# Patient Record
Sex: FEMALE | NOT HISPANIC OR LATINO | Employment: FULL TIME | URBAN - METROPOLITAN AREA
[De-identification: names, ages, dates, MRNs, and addresses within clinical notes are randomized per-mention and may not be internally consistent; named-entity substitution may affect disease eponyms.]

---

## 2023-03-20 ENCOUNTER — TELEPHONE (OUTPATIENT)
Dept: PRIMARY CARE | Facility: CLINIC | Age: 63
End: 2023-03-20
Payer: COMMERCIAL

## 2023-03-20 DIAGNOSIS — E11.9 DIABETES MELLITUS (MULTI): Primary | ICD-10-CM

## 2023-03-20 NOTE — TELEPHONE ENCOUNTER
Patient requesting a refill on her Ozempic to [facility]  Patient stated she has gone 2 weeks without this medication and would like it filled ASAP

## 2023-03-23 PROBLEM — E66.9 CLASS 1 OBESITY WITH BODY MASS INDEX (BMI) OF 32.0 TO 32.9 IN ADULT: Status: ACTIVE | Noted: 2023-03-23

## 2023-03-23 PROBLEM — E78.5 HYPERLIPIDEMIA: Status: ACTIVE | Noted: 2023-03-23

## 2023-03-23 PROBLEM — I10 HIGH BLOOD PRESSURE: Status: ACTIVE | Noted: 2023-03-23

## 2023-03-23 PROBLEM — R39.9 UTI SYMPTOMS: Status: ACTIVE | Noted: 2023-03-23

## 2023-03-23 PROBLEM — R74.8 ELEVATED LIVER ENZYMES: Status: ACTIVE | Noted: 2023-03-23

## 2023-03-23 PROBLEM — M54.30 SCIATIC PAIN: Status: ACTIVE | Noted: 2023-03-23

## 2023-03-23 PROBLEM — E11.9 TYPE 2 DIABETES MELLITUS (MULTI): Status: ACTIVE | Noted: 2023-03-23

## 2023-03-23 PROBLEM — R11.2 NAUSEA & VOMITING: Status: ACTIVE | Noted: 2023-03-23

## 2023-03-23 PROBLEM — E11.9 DIABETES MELLITUS (MULTI): Status: ACTIVE | Noted: 2023-03-23

## 2023-03-23 PROBLEM — K57.90 DIVERTICULOSIS: Status: ACTIVE | Noted: 2023-03-23

## 2023-03-23 RX ORDER — SEMAGLUTIDE 1.34 MG/ML
0.5 INJECTION, SOLUTION SUBCUTANEOUS
COMMUNITY
Start: 2023-01-26 | End: 2023-03-23 | Stop reason: SDUPTHER

## 2023-03-23 RX ORDER — TIZANIDINE 4 MG/1
4 TABLET ORAL 3 TIMES DAILY
COMMUNITY

## 2023-03-23 RX ORDER — SEMAGLUTIDE 1.34 MG/ML
0.5 INJECTION, SOLUTION SUBCUTANEOUS
Qty: 1 EACH | Refills: 3 | Status: SHIPPED | OUTPATIENT
Start: 2023-03-23

## 2023-03-23 RX ORDER — HYDROCHLOROTHIAZIDE 25 MG/1
25 TABLET ORAL DAILY
COMMUNITY
Start: 2023-02-25

## 2023-03-23 RX ORDER — IBUPROFEN 600 MG/1
1 TABLET ORAL EVERY 8 HOURS PRN
COMMUNITY
Start: 2022-12-20

## 2023-03-23 RX ORDER — GLIPIZIDE 10 MG/1
10 TABLET ORAL 2 TIMES DAILY
COMMUNITY
Start: 2022-09-05

## 2023-03-23 RX ORDER — ATORVASTATIN CALCIUM 10 MG/1
10 TABLET, FILM COATED ORAL DAILY
COMMUNITY
Start: 2023-01-25

## 2023-03-23 RX ORDER — LISINOPRIL 40 MG/1
1 TABLET ORAL DAILY
COMMUNITY
Start: 2023-02-25

## 2023-03-23 RX ORDER — METOCLOPRAMIDE 5 MG/1
5 TABLET ORAL 3 TIMES DAILY
COMMUNITY
Start: 2022-06-29

## 2023-03-23 RX ORDER — GABAPENTIN 300 MG/1
300 CAPSULE ORAL 3 TIMES DAILY
COMMUNITY
Start: 2023-02-25

## 2023-03-23 NOTE — TELEPHONE ENCOUNTER
PATIENT IS EXTREMELY ANGRY REGARDING THE ISSUES WITH GETTING HER MEDICATION. SHE HAS BEEN WITHOUT DIABETIC MEDS FOR 3 WEEKS.  I AM GOING TO SET HER AN APPOINTMENT FOR Tuesday IN THE [facility] BUT SHE NEEDS MEDS ASAP.    PLEASE ADVISE [phone]

## 2023-03-23 NOTE — TELEPHONE ENCOUNTER
[facility]

## 2023-03-27 ENCOUNTER — TELEPHONE (OUTPATIENT)
Dept: PRIMARY CARE | Facility: CLINIC | Age: 63
End: 2023-03-27
Payer: COMMERCIAL

## 2023-03-27 NOTE — TELEPHONE ENCOUNTER
PATIENT CALLED BACK STATING THE DOSAGE IN INCORRECT PER THE PHARMACY ([facility] CLARICE BOYLE) FOR THE CURRENT PA.    PLEASE ADVISE

## 2023-05-31 ENCOUNTER — OFFICE VISIT (OUTPATIENT)
Dept: PRIMARY CARE | Facility: CLINIC | Age: 63
End: 2023-05-31
Payer: COMMERCIAL

## 2023-05-31 VITALS
HEART RATE: 73 BPM | TEMPERATURE: 97.4 F | WEIGHT: 213 LBS | DIASTOLIC BLOOD PRESSURE: 76 MMHG | BODY MASS INDEX: 35.45 KG/M2 | OXYGEN SATURATION: 99 % | SYSTOLIC BLOOD PRESSURE: 129 MMHG | RESPIRATION RATE: 16 BRPM

## 2023-05-31 DIAGNOSIS — E11.69 TYPE 2 DIABETES MELLITUS WITH OTHER SPECIFIED COMPLICATION, WITHOUT LONG-TERM CURRENT USE OF INSULIN (MULTI): Primary | ICD-10-CM

## 2023-05-31 DIAGNOSIS — I10 PRIMARY HYPERTENSION: ICD-10-CM

## 2023-05-31 DIAGNOSIS — E78.5 HYPERLIPIDEMIA, UNSPECIFIED HYPERLIPIDEMIA TYPE: ICD-10-CM

## 2023-05-31 DIAGNOSIS — M79.672 PAIN OF LEFT HEEL: ICD-10-CM

## 2023-05-31 PROCEDURE — 99213 OFFICE O/P EST LOW 20 MIN: CPT | Performed by: NURSE PRACTITIONER

## 2023-05-31 PROCEDURE — 1036F TOBACCO NON-USER: CPT | Performed by: NURSE PRACTITIONER

## 2023-05-31 PROCEDURE — 3074F SYST BP LT 130 MM HG: CPT | Performed by: NURSE PRACTITIONER

## 2023-05-31 PROCEDURE — 3078F DIAST BP <80 MM HG: CPT | Performed by: NURSE PRACTITIONER

## 2023-05-31 PROCEDURE — 4010F ACE/ARB THERAPY RXD/TAKEN: CPT | Performed by: NURSE PRACTITIONER

## 2023-05-31 RX ORDER — POTASSIUM CHLORIDE 750 MG/1
CAPSULE, EXTENDED RELEASE ORAL
COMMUNITY
Start: 2023-04-24

## 2023-05-31 ASSESSMENT — ENCOUNTER SYMPTOMS
PSYCHIATRIC NEGATIVE: 1
RESPIRATORY NEGATIVE: 1
GASTROINTESTINAL NEGATIVE: 1
NEUROLOGICAL NEGATIVE: 1
HEMATOLOGIC/LYMPHATIC NEGATIVE: 1
JOINT SWELLING: 1
ARTHRALGIAS: 1
ENDOCRINE NEGATIVE: 1
ALLERGIC/IMMUNOLOGIC NEGATIVE: 1
CARDIOVASCULAR NEGATIVE: 1
CONSTITUTIONAL NEGATIVE: 1
EYES NEGATIVE: 1

## 2023-05-31 NOTE — PROGRESS NOTES
Patient's PCP is Kelly J Slavik-Bosworth, APRN-CNP      Patient to discuss LEFT  HEEL PAIN  Pain Scale:  7 to a 9 out of 10   Length of Symptoms: Couple weeks   Denies:  Factors that effect symptoms: Ice, changing shoes throughout the day, ibuprofen,     Patient to discuss MEDICATIONS   -Glipizide ---discuss changing   -sugar stats have increased  -bloated,     --Related Information--

## 2023-05-31 NOTE — PROGRESS NOTES
Subjective   Patient ID: Virgen Murillo is a 63 y.o. female who presents for No chief complaint on file..  Patient presents to convenient care appointment with complaint of left heel pain x 2 weeks.  Patient reports increased pain with standing and walking.  Patient has been icing and elevating with minimal relief.  Patient reports no ozempic due to insurance, and has gained 15 lbs in two months.  Glucose at home has been 110  increased to 170s.  Diet and exercise has been same.  Patient was previously on ozempic x 2 years with great success.  Not taking last two months.            Review of Systems   Constitutional: Negative.    HENT: Negative.     Eyes: Negative.    Respiratory: Negative.     Cardiovascular: Negative.    Gastrointestinal: Negative.    Endocrine: Negative.    Genitourinary: Negative.    Musculoskeletal:  Positive for arthralgias and joint swelling.   Skin: Negative.    Allergic/Immunologic: Negative.    Neurological: Negative.    Hematological: Negative.    Psychiatric/Behavioral: Negative.     All other systems reviewed and are negative.      /76   Pulse 73   Temp 36.3 °C (97.4 °F)   Resp 16   Wt 96.6 kg (213 lb)   SpO2 99%   BMI 35.45 kg/m²     Objective   Physical Exam  Vitals and nursing note reviewed.   Constitutional:       Appearance: Normal appearance.   HENT:      Head: Normocephalic and atraumatic.      Right Ear: Tympanic membrane, ear canal and external ear normal.      Left Ear: Tympanic membrane, ear canal and external ear normal.      Nose: Nose normal.      Mouth/Throat:      Mouth: Mucous membranes are moist.      Pharynx: Oropharynx is clear.   Eyes:      Extraocular Movements: Extraocular movements intact.      Conjunctiva/sclera: Conjunctivae normal.      Pupils: Pupils are equal, round, and reactive to light.   Cardiovascular:      Rate and Rhythm: Normal rate and regular rhythm.      Pulses: Normal pulses.      Heart sounds: Normal heart sounds.   Pulmonary:       Effort: Pulmonary effort is normal.      Breath sounds: No wheezing, rhonchi or rales.   Abdominal:      General: Bowel sounds are normal.      Palpations: Abdomen is soft.   Musculoskeletal:         General: Swelling and tenderness present. No deformity or signs of injury. Normal range of motion.      Cervical back: Normal range of motion and neck supple.      Right lower leg: No edema.      Left lower leg: Edema present.      Comments: Patient has increased tenderness with palpation of left heel at achilles tendon insertion on calcaneous.  Mild non-pitting edema present posterior heel. No ecchymosis, erythema, nor wound present.    Skin:     General: Skin is warm and dry.      Capillary Refill: Capillary refill takes less than 2 seconds.   Neurological:      General: No focal deficit present.      Mental Status: She is alert and oriented to person, place, and time.   Psychiatric:         Mood and Affect: Mood normal.         Behavior: Behavior normal.         Thought Content: Thought content normal.         Judgment: Judgment normal.         Assessment/Plan   Problem List Items Addressed This Visit          Circulatory    High blood pressure     Continue to monitor BP and have labs completed.  Patient will continue to follow low sodium diet as discussed.          Relevant Orders    Comprehensive Metabolic Panel       Musculoskeletal    Pain of left heel     Patient will continue to ice, elevate and follow up with orthopedics.           Relevant Orders    Referral to Orthopaedic Surgery       Endocrine/Metabolic    Type 2 diabetes mellitus (CMS/HCC) - Primary     Patient will continue to check glucose at home as directed.  Patient will be contacted for referral to endocrine scheduling.          Relevant Orders    Hemoglobin A1C    Referral to Endocrinology       Other    Hyperlipidemia     Lab to be completed and patient will be notified of result when complete. Continue healthy diet, low in saturated fats.            Relevant Orders    Lipid Panel

## 2023-06-01 ENCOUNTER — LAB (OUTPATIENT)
Dept: LAB | Facility: LAB | Age: 63
End: 2023-06-01
Payer: COMMERCIAL

## 2023-06-01 DIAGNOSIS — E11.69 TYPE 2 DIABETES MELLITUS WITH OTHER SPECIFIED COMPLICATION, WITHOUT LONG-TERM CURRENT USE OF INSULIN (MULTI): ICD-10-CM

## 2023-06-01 DIAGNOSIS — I10 PRIMARY HYPERTENSION: ICD-10-CM

## 2023-06-01 DIAGNOSIS — E78.5 HYPERLIPIDEMIA, UNSPECIFIED HYPERLIPIDEMIA TYPE: ICD-10-CM

## 2023-06-01 PROBLEM — M79.672 PAIN OF LEFT HEEL: Status: ACTIVE | Noted: 2023-06-01

## 2023-06-01 LAB
ALANINE AMINOTRANSFERASE (SGPT) (U/L) IN SER/PLAS: 31 U/L (ref 7–45)
ALBUMIN (G/DL) IN SER/PLAS: 4.2 G/DL (ref 3.4–5)
ALKALINE PHOSPHATASE (U/L) IN SER/PLAS: 52 U/L (ref 33–136)
ANION GAP IN SER/PLAS: 11 MMOL/L (ref 10–20)
ASPARTATE AMINOTRANSFERASE (SGOT) (U/L) IN SER/PLAS: 32 U/L (ref 9–39)
BILIRUBIN TOTAL (MG/DL) IN SER/PLAS: 0.9 MG/DL (ref 0–1.2)
CALCIUM (MG/DL) IN SER/PLAS: 9.7 MG/DL (ref 8.6–10.3)
CARBON DIOXIDE, TOTAL (MMOL/L) IN SER/PLAS: 30 MMOL/L (ref 21–32)
CHLORIDE (MMOL/L) IN SER/PLAS: 102 MMOL/L (ref 98–107)
CHOLESTEROL (MG/DL) IN SER/PLAS: 150 MG/DL (ref 0–199)
CHOLESTEROL IN HDL (MG/DL) IN SER/PLAS: 36 MG/DL
CHOLESTEROL/HDL RATIO: 4.2
CREATININE (MG/DL) IN SER/PLAS: 1.17 MG/DL (ref 0.5–1.05)
ESTIMATED AVERAGE GLUCOSE FOR HBA1C: 223 MG/DL
GFR FEMALE: 52 ML/MIN/1.73M2
GLUCOSE (MG/DL) IN SER/PLAS: 180 MG/DL (ref 74–99)
HEMOGLOBIN A1C/HEMOGLOBIN TOTAL IN BLOOD: 9.4 %
LDL: 87 MG/DL (ref 0–99)
POTASSIUM (MMOL/L) IN SER/PLAS: 4.4 MMOL/L (ref 3.5–5.3)
PROTEIN TOTAL: 7.3 G/DL (ref 6.4–8.2)
SODIUM (MMOL/L) IN SER/PLAS: 139 MMOL/L (ref 136–145)
TRIGLYCERIDE (MG/DL) IN SER/PLAS: 134 MG/DL (ref 0–149)
UREA NITROGEN (MG/DL) IN SER/PLAS: 21 MG/DL (ref 6–23)
VLDL: 27 MG/DL (ref 0–40)

## 2023-06-01 PROCEDURE — 83036 HEMOGLOBIN GLYCOSYLATED A1C: CPT

## 2023-06-01 PROCEDURE — 36415 COLL VENOUS BLD VENIPUNCTURE: CPT

## 2023-06-01 PROCEDURE — 80061 LIPID PANEL: CPT

## 2023-06-01 PROCEDURE — 80053 COMPREHEN METABOLIC PANEL: CPT

## 2023-06-01 RX ORDER — LIDOCAINE 50 MG/G
1 PATCH TOPICAL DAILY
Qty: 7 PATCH | Refills: 0 | Status: SHIPPED | OUTPATIENT
Start: 2023-06-01 | End: 2023-06-08

## 2023-06-01 NOTE — ASSESSMENT & PLAN NOTE
Patient will continue to check glucose at home as directed.  Patient will be contacted for referral to endocrine scheduling.

## 2023-06-01 NOTE — PATIENT INSTRUCTIONS
Patient was seen and examined.  Diagnosis, treatment, and possible complications of today's illness discussed and explained to patient.  Patient to take medications associated with this visit.  Patient may also take OTC analgesic/antipyretic if needed for pain/fever.  Advised to increase oral fluid intake as tolerated if no nausea or vomiting.  Patient educated and advised to come back if worsening or persistent symptoms. Patient educated on when to seek urgent/emergent care. Patient was given opportunity to ask questions and denied any at this time.  Patient verbalized understanding and agrees with plan of care.

## 2023-06-01 NOTE — ASSESSMENT & PLAN NOTE
Patient will continue to ice, elevate and follow up with orthopedics.   Patient can use lidocaine patch as directed for pain.

## 2023-06-01 NOTE — ASSESSMENT & PLAN NOTE
Continue to monitor BP and have labs completed.  Patient will continue to follow low sodium diet as discussed.

## 2023-06-01 NOTE — ASSESSMENT & PLAN NOTE
Lab to be completed and patient will be notified of result when complete. Continue healthy diet, low in saturated fats.

## 2023-06-02 NOTE — RESULT ENCOUNTER NOTE
Discussed lab results with patient. Hdl lower 36, increase exercise.  A1C elevated to 9/4 an glucose 180.  Patient has referral to endocrine and is scheduling at this time.  Patient will continue to work on diet and exercise to maintain lower glucose readings.

## 2023-06-09 ENCOUNTER — TELEPHONE (OUTPATIENT)
Dept: PRIMARY CARE | Facility: CLINIC | Age: 63
End: 2023-06-09
Payer: COMMERCIAL

## 2023-06-09 DIAGNOSIS — M79.672 PAIN OF LEFT HEEL: Primary | ICD-10-CM

## 2023-06-09 RX ORDER — LIDOCAINE 50 MG/G
1 PATCH TOPICAL DAILY
Qty: 7 PATCH | Refills: 0 | Status: SHIPPED | OUTPATIENT
Start: 2023-06-09 | End: 2023-06-16

## 2023-06-09 NOTE — TELEPHONE ENCOUNTER
Spoke with patient regarding appointments and medication.  Patient will reschedule orthopedic appointment and follow up.

## 2023-06-09 NOTE — TELEPHONE ENCOUNTER
Rx Refill Request Telephone Encounter    Name:  Virgen Murillo  :  [id number]  Medication Name:  lidocaine (Lidoderm) 5 % patch   Specific Pharmacy location:  [facility]  Date of last appointment:    Date of next appointment:  NA  Best number to reach patient:  [phone]

## 2023-06-26 ENCOUNTER — TELEPHONE (OUTPATIENT)
Dept: PRIMARY CARE | Facility: CLINIC | Age: 63
End: 2023-06-26
Payer: COMMERCIAL

## 2023-06-26 DIAGNOSIS — E11.69 TYPE 2 DIABETES MELLITUS WITH OTHER SPECIFIED COMPLICATION, WITHOUT LONG-TERM CURRENT USE OF INSULIN (MULTI): Primary | ICD-10-CM

## 2023-06-26 DIAGNOSIS — R11.0 NAUSEA: ICD-10-CM

## 2023-06-26 RX ORDER — ONDANSETRON 4 MG/1
4 TABLET, ORALLY DISINTEGRATING ORAL EVERY 8 HOURS PRN
Qty: 20 TABLET | Refills: 0 | Status: SHIPPED | OUTPATIENT
Start: 2023-06-26 | End: 2023-07-11

## 2023-06-26 RX ORDER — METFORMIN HYDROCHLORIDE 500 MG/1
500 TABLET, EXTENDED RELEASE ORAL
Qty: 30 TABLET | Refills: 11 | Status: SHIPPED | OUTPATIENT
Start: 2023-06-26 | End: 2024-06-25

## 2023-06-26 NOTE — TELEPHONE ENCOUNTER
Patient called in wondering if Nurse Salome has received the message yet and if she has had a chance to respond. Patient was informed that Salome got in the office today at 10 am and has been slammed with patient's ever since.

## 2023-06-26 NOTE — TELEPHONE ENCOUNTER
Patient called stating a referral to endocrinology was put in the system and patient cannot get in for an apt until August. Patient stated the diabetes medication that was prescribed to her is not working, she stated her blood sugar was in the 230's last week.   Patient would like a call back from Salome regarding this.   Please advise

## 2023-06-26 NOTE — TELEPHONE ENCOUNTER
Contacted patient .  Discussed previous medication Metformin.  Patient reported vomiting and was taken off many years ago.  Patient has appointment scheduled for August with endocrinologist.  Patient will take Metformin  mg daily and contact office with any GI concerns. Patient reports daily glucose 200s and will continue to monitor.

## 2023-07-19 DIAGNOSIS — E11.69 TYPE 2 DIABETES MELLITUS WITH OTHER SPECIFIED COMPLICATION, WITHOUT LONG-TERM CURRENT USE OF INSULIN (MULTI): Primary | ICD-10-CM

## 2023-07-20 RX ORDER — METFORMIN HYDROCHLORIDE 500 MG/1
500 TABLET, EXTENDED RELEASE ORAL
Qty: 30 TABLET | Refills: 11 | OUTPATIENT
Start: 2023-07-20 | End: 2024-07-19

## 2023-07-20 NOTE — TELEPHONE ENCOUNTER
Medication refused due to failing protocol.    Requested Prescriptions   Pending Prescriptions Disp Refills    metFORMIN XR (Glucophage-XR) 500 mg 24 hr tablet [Pharmacy Med Name: METFORMIN HCL  MG TABLET] 30 tablet 11     Sig: TAKE 1 TABLET (500 MG) BY MOUTH ONCE DAILY WITH A MEAL. DO NOT CRUSH, CHEW, OR SPLIT.       There is no refill protocol information for this order

## 2023-08-23 ENCOUNTER — LAB (OUTPATIENT)
Dept: LAB | Facility: LAB | Age: 63
End: 2023-08-23
Payer: COMMERCIAL

## 2023-08-23 LAB
ALANINE AMINOTRANSFERASE (SGPT) (U/L) IN SER/PLAS: 37 U/L (ref 7–45)
ALBUMIN (G/DL) IN SER/PLAS: 4.4 G/DL (ref 3.4–5)
ALBUMIN (MG/L) IN URINE: <7 MG/L
ALBUMIN/CREATININE (UG/MG) IN URINE: NORMAL UG/MG CRT (ref 0–30)
ALKALINE PHOSPHATASE (U/L) IN SER/PLAS: 53 U/L (ref 33–136)
ANION GAP IN SER/PLAS: 13 MMOL/L (ref 10–20)
ASPARTATE AMINOTRANSFERASE (SGOT) (U/L) IN SER/PLAS: 40 U/L (ref 9–39)
BILIRUBIN TOTAL (MG/DL) IN SER/PLAS: 0.7 MG/DL (ref 0–1.2)
C PEPTIDE (NG/ML) IN SER/PLAS: 8.8 NG/ML (ref 0.7–3.9)
CALCIUM (MG/DL) IN SER/PLAS: 9.6 MG/DL (ref 8.6–10.6)
CARBON DIOXIDE, TOTAL (MMOL/L) IN SER/PLAS: 29 MMOL/L (ref 21–32)
CHLORIDE (MMOL/L) IN SER/PLAS: 99 MMOL/L (ref 98–107)
CREATININE (MG/DL) IN SER/PLAS: 1.03 MG/DL (ref 0.5–1.05)
CREATININE (MG/DL) IN URINE: 29 MG/DL (ref 20–320)
ESTIMATED AVERAGE GLUCOSE FOR HBA1C: 275 MG/DL
GFR FEMALE: 61 ML/MIN/1.73M2
GLUCOSE (MG/DL) IN SER/PLAS: 127 MG/DL (ref 74–99)
HEMOGLOBIN A1C/HEMOGLOBIN TOTAL IN BLOOD: 11.2 %
LIPASE (U/L) IN SER/PLAS: 252 U/L (ref 9–82)
POTASSIUM (MMOL/L) IN SER/PLAS: 4.1 MMOL/L (ref 3.5–5.3)
PROTEIN TOTAL: 7.4 G/DL (ref 6.4–8.2)
SODIUM (MMOL/L) IN SER/PLAS: 137 MMOL/L (ref 136–145)
THYROTROPIN (MIU/L) IN SER/PLAS BY DETECTION LIMIT <= 0.05 MIU/L: 1.52 MIU/L (ref 0.44–3.98)
UREA NITROGEN (MG/DL) IN SER/PLAS: 16 MG/DL (ref 6–23)